# Patient Record
Sex: FEMALE | Race: WHITE | NOT HISPANIC OR LATINO | URBAN - METROPOLITAN AREA
[De-identification: names, ages, dates, MRNs, and addresses within clinical notes are randomized per-mention and may not be internally consistent; named-entity substitution may affect disease eponyms.]

---

## 2023-06-29 LAB
AMPHETAMINE (PRESENCE) IN URINE BY SCREEN METHOD: ABNORMAL
BARBITURATES PRESENCE IN URINE BY SCREEN METHOD: ABNORMAL
BENZODIAZEPINE (PRESENCE) IN URINE BY SCREEN METHOD: ABNORMAL
CANNABINOIDS IN URINE BY SCREEN METHOD: ABNORMAL
COCAINE (PRESENCE) IN URINE BY SCREEN METHOD: ABNORMAL
DRUG SCREEN COMMENT URINE: ABNORMAL
FENTANYL URINE: ABNORMAL
METHADONE (PRESENCE) IN URINE BY SCREEN METHOD: ABNORMAL
OPIATES (PRESENCE) IN URINE BY SCREEN METHOD: ABNORMAL
OXYCODONE (PRESENCE) IN URINE BY SCREEN METHOD: ABNORMAL
PHENCYCLIDINE (PRESENCE) IN URINE BY SCREEN METHOD: ABNORMAL

## 2023-07-04 LAB
AMPHETAMINES,URINE: 1285 NG/ML
MDA,URINE: <200 NG/ML
MDEA,URINE: <200 NG/ML
MDMA,UR: <200 NG/ML
METHAMPHETAMINE QUANTITATIVE URINE: <200 NG/ML
PHENTERMINE,UR: <200 NG/ML

## 2023-10-24 PROBLEM — Z86.59 HISTORY OF ADHD: Status: ACTIVE | Noted: 2023-10-24

## 2023-10-24 PROBLEM — Z86.59 HISTORY OF OCD (OBSESSIVE COMPULSIVE DISORDER): Status: ACTIVE | Noted: 2023-10-24

## 2023-10-24 PROBLEM — F39 MOOD DISORDER (CMS-HCC): Status: ACTIVE | Noted: 2023-10-24

## 2023-10-24 RX ORDER — ARIPIPRAZOLE 20 MG/1
20 TABLET ORAL NIGHTLY
COMMUNITY
End: 2023-10-25 | Stop reason: SDUPTHER

## 2023-10-24 RX ORDER — TRAZODONE HYDROCHLORIDE 100 MG/1
100 TABLET ORAL NIGHTLY
COMMUNITY
End: 2023-10-25 | Stop reason: SDUPTHER

## 2023-10-24 RX ORDER — LORAZEPAM 0.5 MG/1
TABLET ORAL
COMMUNITY
End: 2024-01-17 | Stop reason: WASHOUT

## 2023-10-24 RX ORDER — FLUVOXAMINE MALEATE 100 MG/1
2 TABLET, COATED ORAL NIGHTLY
COMMUNITY
End: 2023-10-25 | Stop reason: SDUPTHER

## 2023-10-24 RX ORDER — ADAPALENE AND BENZOYL PEROXIDE GEL, 0.1%/2.5% 1; 25 MG/G; MG/G
GEL TOPICAL
COMMUNITY

## 2023-10-24 RX ORDER — MINOCYCLINE HYDROCHLORIDE 100 MG/1
CAPSULE ORAL
COMMUNITY

## 2023-10-24 RX ORDER — LISDEXAMFETAMINE DIMESYLATE 30 MG/1
30 CAPSULE ORAL EVERY MORNING
COMMUNITY
End: 2023-10-25 | Stop reason: SDUPTHER

## 2023-10-25 ENCOUNTER — TELEMEDICINE (OUTPATIENT)
Dept: BEHAVIORAL HEALTH | Facility: CLINIC | Age: 28
End: 2023-10-25
Payer: COMMERCIAL

## 2023-10-25 DIAGNOSIS — Z86.59 HISTORY OF OCD (OBSESSIVE COMPULSIVE DISORDER): ICD-10-CM

## 2023-10-25 DIAGNOSIS — Z86.59 HISTORY OF ADHD: ICD-10-CM

## 2023-10-25 DIAGNOSIS — F39 MOOD DISORDER (CMS-HCC): ICD-10-CM

## 2023-10-25 PROCEDURE — 99214 OFFICE O/P EST MOD 30 MIN: CPT

## 2023-10-25 RX ORDER — FLUVOXAMINE MALEATE 100 MG/1
200 TABLET, COATED ORAL NIGHTLY
Qty: 60 TABLET | Refills: 1 | Status: SHIPPED | OUTPATIENT
Start: 2023-10-25 | End: 2023-12-23 | Stop reason: SDUPTHER

## 2023-10-25 RX ORDER — TRAZODONE HYDROCHLORIDE 100 MG/1
100 TABLET ORAL NIGHTLY
Qty: 30 TABLET | Refills: 1 | Status: SHIPPED | OUTPATIENT
Start: 2023-10-25 | End: 2023-12-09 | Stop reason: SDUPTHER

## 2023-10-25 RX ORDER — ARIPIPRAZOLE 20 MG/1
20 TABLET ORAL NIGHTLY
Qty: 30 TABLET | Refills: 1 | Status: SHIPPED | OUTPATIENT
Start: 2023-10-25 | End: 2023-12-07 | Stop reason: SDUPTHER

## 2023-10-25 RX ORDER — LISDEXAMFETAMINE DIMESYLATE 40 MG/1
40 CAPSULE ORAL EVERY MORNING
Qty: 30 CAPSULE | Refills: 0 | Status: SHIPPED | OUTPATIENT
Start: 2023-10-25 | End: 2023-11-20 | Stop reason: SDUPTHER

## 2023-10-25 NOTE — PATIENT INSTRUCTIONS
Continue Abilify, Luvox and trazodone without change   Increase Yyvanse to 40mg daily  Follow up visit in 4 weeks or sooner if needed
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
## 2023-11-20 ENCOUNTER — TELEPHONE (OUTPATIENT)
Dept: BEHAVIORAL HEALTH | Facility: CLINIC | Age: 28
End: 2023-11-20
Payer: COMMERCIAL

## 2023-11-20 DIAGNOSIS — Z86.59 HISTORY OF ADHD: ICD-10-CM

## 2023-11-20 RX ORDER — LISDEXAMFETAMINE DIMESYLATE 40 MG/1
40 CAPSULE ORAL EVERY MORNING
Qty: 30 CAPSULE | Refills: 0 | Status: SHIPPED | OUTPATIENT
Start: 2023-11-23 | End: 2023-12-09 | Stop reason: SDUPTHER

## 2023-11-20 NOTE — PROGRESS NOTES
Received refill request for Vyvanse 40mg daily to [facility] pharmacy on file. Reviewed OARRS on 11/20/23 - Vyvanse last filled on 10/25/23. Refill placed with [facility]NF[facility] date of 11/23/23.

## 2023-12-07 ENCOUNTER — OFFICE VISIT (OUTPATIENT)
Dept: BEHAVIORAL HEALTH | Facility: CLINIC | Age: 28
End: 2023-12-07
Payer: COMMERCIAL

## 2023-12-07 VITALS
SYSTOLIC BLOOD PRESSURE: 113 MMHG | HEART RATE: 94 BPM | HEIGHT: 62 IN | BODY MASS INDEX: 34.96 KG/M2 | DIASTOLIC BLOOD PRESSURE: 77 MMHG | WEIGHT: 190 LBS

## 2023-12-07 DIAGNOSIS — F39 MOOD DISORDER (CMS-HCC): ICD-10-CM

## 2023-12-07 DIAGNOSIS — Z86.59 HISTORY OF ADHD: ICD-10-CM

## 2023-12-07 DIAGNOSIS — Z86.59 HISTORY OF OCD (OBSESSIVE COMPULSIVE DISORDER): ICD-10-CM

## 2023-12-07 PROCEDURE — 99215 OFFICE O/P EST HI 40 MIN: CPT

## 2023-12-07 RX ORDER — ARIPIPRAZOLE 20 MG/1
20 TABLET ORAL NIGHTLY
Qty: 30 TABLET | Refills: 1 | Status: SHIPPED
Start: 2023-12-07 | End: 2024-01-17 | Stop reason: SDUPTHER

## 2023-12-07 RX ORDER — ARIPIPRAZOLE 5 MG/1
5 TABLET ORAL DAILY
Qty: 30 TABLET | Refills: 0 | Status: SHIPPED
Start: 2023-12-07 | End: 2023-12-21 | Stop reason: SINTOL
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
## 2023-12-09 RX ORDER — TRAZODONE HYDROCHLORIDE 100 MG/1
100 TABLET ORAL NIGHTLY
Qty: 30 TABLET | Refills: 1 | Status: SHIPPED
Start: 2023-12-09 | End: 2024-01-17 | Stop reason: SDUPTHER

## 2023-12-09 RX ORDER — LISDEXAMFETAMINE DIMESYLATE 40 MG/1
40 CAPSULE ORAL EVERY MORNING
Qty: 30 CAPSULE | Refills: 0 | Status: SHIPPED
Start: 2023-12-23 | End: 2023-12-21 | Stop reason: ALTCHOICE

## 2023-12-10 NOTE — PATIENT INSTRUCTIONS
Increase Abilify dose to 25mg daily   Continue Vyvanse, Trazodone and Luvox as prescribed  IOP referral placed  Follow up visit in 2-3 weeks or sooner if needed  Please call the office with any questions or concerns

## 2023-12-14 ENCOUNTER — TELEPHONE (OUTPATIENT)
Dept: BEHAVIORAL HEALTH | Facility: CLINIC | Age: 28
End: 2023-12-14
Payer: COMMERCIAL

## 2023-12-20 ENCOUNTER — TELEPHONE (OUTPATIENT)
Dept: BEHAVIORAL HEALTH | Facility: CLINIC | Age: 28
End: 2023-12-20
Payer: COMMERCIAL

## 2023-12-20 NOTE — TELEPHONE ENCOUNTER
Hello, patient has a follow up visit scheduled for tomorrow morning. Will differ on placing new order now and develop a plan together tomorrow if patient is agreeable.

## 2023-12-21 ENCOUNTER — OFFICE VISIT (OUTPATIENT)
Dept: BEHAVIORAL HEALTH | Facility: CLINIC | Age: 28
End: 2023-12-21
Payer: COMMERCIAL

## 2023-12-21 VITALS
HEART RATE: 90 BPM | BODY MASS INDEX: 36.07 KG/M2 | SYSTOLIC BLOOD PRESSURE: 109 MMHG | DIASTOLIC BLOOD PRESSURE: 74 MMHG | WEIGHT: 196 LBS | HEIGHT: 62 IN

## 2023-12-21 DIAGNOSIS — Z86.59 HISTORY OF ADHD: ICD-10-CM

## 2023-12-21 DIAGNOSIS — Z86.59 HISTORY OF OCD (OBSESSIVE COMPULSIVE DISORDER): ICD-10-CM

## 2023-12-21 DIAGNOSIS — G47.00 INSOMNIA, UNSPECIFIED TYPE: ICD-10-CM

## 2023-12-21 DIAGNOSIS — F39 MOOD DISORDER (CMS-HCC): ICD-10-CM

## 2023-12-21 PROCEDURE — 99213 OFFICE O/P EST LOW 20 MIN: CPT

## 2023-12-21 RX ORDER — METHYLPHENIDATE HYDROCHLORIDE 36 MG/1
36 TABLET ORAL DAILY
Qty: 30 TABLET | Refills: 0 | Status: SHIPPED
Start: 2023-12-21 | End: 2023-12-26 | Stop reason: RX
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
## 2023-12-23 PROBLEM — G47.00 INSOMNIA: Status: ACTIVE | Noted: 2023-12-23

## 2023-12-23 RX ORDER — FLUVOXAMINE MALEATE 100 MG/1
200 TABLET, COATED ORAL NIGHTLY
Qty: 60 TABLET | Refills: 1 | Status: SHIPPED
Start: 2023-12-23 | End: 2024-01-17 | Stop reason: SDUPTHER

## 2023-12-24 NOTE — PATIENT INSTRUCTIONS
Continue Trazodone and Luvox as prescribed  Reduce Abilify dose to 20mg daily   STOP Vyvanse and start Generic Concerta 36mg daily   Follow up visit in 4 weeks or sooner if needed  Please call the office or message via [employer] with any questions or concerns

## 2023-12-26 ENCOUNTER — TELEPHONE (OUTPATIENT)
Dept: BEHAVIORAL HEALTH | Facility: CLINIC | Age: 28
End: 2023-12-26
Payer: COMMERCIAL

## 2023-12-26 DIAGNOSIS — Z86.59 HISTORY OF ADHD: ICD-10-CM

## 2023-12-26 RX ORDER — METHYLPHENIDATE HYDROCHLORIDE 36 MG/1
36 TABLET ORAL DAILY
Qty: 30 TABLET | Refills: 0 | Status: SHIPPED | OUTPATIENT
Start: 2023-12-26 | End: 2024-01-17 | Stop reason: SDUPTHER

## 2023-12-26 NOTE — PROGRESS NOTES
Patient called in requesting Concerta be sent to [facility], current pharmacy is out of supply. Reviewed OARRS on 12/26/23. No discrepancies or concerns noted. New prescription placed to requested pharmacy and unfilled order cancelled.

## 2024-01-17 ENCOUNTER — TELEMEDICINE (OUTPATIENT)
Dept: BEHAVIORAL HEALTH | Facility: CLINIC | Age: 29
End: 2024-01-17
Payer: COMMERCIAL

## 2024-01-17 DIAGNOSIS — F39 MOOD DISORDER (CMS-HCC): ICD-10-CM

## 2024-01-17 DIAGNOSIS — Z86.59 HISTORY OF ADHD: ICD-10-CM

## 2024-01-17 DIAGNOSIS — G47.00 INSOMNIA, UNSPECIFIED TYPE: ICD-10-CM

## 2024-01-17 DIAGNOSIS — Z86.59 HISTORY OF OCD (OBSESSIVE COMPULSIVE DISORDER): ICD-10-CM

## 2024-01-17 PROCEDURE — 99214 OFFICE O/P EST MOD 30 MIN: CPT

## 2024-01-17 RX ORDER — ARIPIPRAZOLE 20 MG/1
20 TABLET ORAL NIGHTLY
Qty: 90 TABLET | Refills: 0 | Status: SHIPPED | OUTPATIENT
Start: 2024-01-17 | End: 2024-03-04 | Stop reason: SDUPTHER

## 2024-01-17 RX ORDER — FLUVOXAMINE MALEATE 100 MG/1
200 TABLET, COATED ORAL NIGHTLY
Qty: 180 TABLET | Refills: 0 | Status: SHIPPED | OUTPATIENT
Start: 2024-01-17 | End: 2024-03-04 | Stop reason: SDUPTHER

## 2024-01-17 RX ORDER — TRAZODONE HYDROCHLORIDE 100 MG/1
100 TABLET ORAL NIGHTLY
Qty: 90 TABLET | Refills: 0 | Status: SHIPPED | OUTPATIENT
Start: 2024-01-17 | End: 2024-05-11 | Stop reason: SDUPTHER

## 2024-01-17 RX ORDER — METHYLPHENIDATE HYDROCHLORIDE 54 MG/1
54 TABLET ORAL EVERY MORNING
Qty: 30 TABLET | Refills: 0 | Status: SHIPPED | OUTPATIENT
Start: 2024-02-15 | End: 2024-03-05 | Stop reason: SDUPTHER

## 2024-01-17 RX ORDER — METHYLPHENIDATE HYDROCHLORIDE 54 MG/1
54 TABLET ORAL DAILY
Qty: 30 TABLET | Refills: 0 | Status: SHIPPED | OUTPATIENT
Start: 2024-01-17 | End: 2024-03-05 | Stop reason: SDUPTHER

## 2024-01-17 NOTE — PROGRESS NOTES
"Subjective   Patient ID: Lynda Porter is a 28 y.o. female who presents for ADHD, OCD (Obsessive-Compulsive Disorder), Depression, and Med Management Last visit with this writer on 12/21/23.     HPI  Patient arrived on camera for virtual appointment. Patient appears calm and cooperative. A/Ox3.   When asked how she is doing patient reports \"A little bit better\". Mood improving. 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 Patient reports increasing stress with 3 jobs - \"A lot more on my plate now\". Enjoyed time with family over the holidays - \"really nice hilaria\". Taking time to relax on days off from work. Patient was able to speak with IOP team - not feasible now logistically with new job. Discussed evening IOP at [facility] as alternative option. Patient was able to start Concerta 30mg daily - somewhat helpful for ADHD symptoms but less so than Vyvanse - \"least effective one\". Patient reports difficulty focusing at work and staying on tasks. 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 Currently in marriage counseling with  - going well.     - anxiety -   No worrying/persistent nervousness   Restlessness/keyed up or on edge: intermittent - often at work   Irritability: endorses   Muscle tension: denies   panic attacks denies  rarely using PRN Ativan, not taken in months     - Depression -   Mood: \"down\", \"doing better\"   - influenced by recent external stressors   No anhedonia - enjoying crafting and time with friends   Appetite: stable   + weight gain   Sleep: 6hrs/night   + restless sleep  + TEVIN, using mouth guard   Energy levels: + fatigue  Concentration/Focus: focused during assessment. Impaired per patient   No hopelessness/guilt reported   No SI/HI or NSSIB since last visit - \"Really good with that\"     No isamar/hypomania since last visit   No AVH  No fear/paranoia     OCD   - compulsive " scrap booking improving   - spending habits improving   - no specific obsessive/intrusive thoughts thoughts reported today     ETOH: 1 drink/month. No urges or cravings to drink.   MJ - denies   illicit drug use - denies  Daily nicotine ouches     Safety:   Patient is moderate acute and moderate chronic risk of suicide. Static risk factors include female gender,  race and age 27. Dynamic risk factors include above psychiatric symptoms which we are addressing with medication. Protective factors include no current drug abuse, rational thinking intact, good social support, , help seeking, no SI, hopeful, future oriented.    Objective   Physical Exam  Constitutional:       Appearance: Normal appearance.   Neurological:      Mental Status: She is alert and oriented to person, place, and time.   Psychiatric:         Thought Content: Thought content normal.         Judgment: Judgment normal.       General Appearance: Well groomed, appropriate eye contact  Attitude/Behavior: Cooperative  Motor: No psychomotor agitation or retardation, no tremor or other abnormal movements  Speech: Normal rate, volume, prosody  Gait/Station: WFL - Within functional limits  Mood: depressed  Affect: Euthymic, full-range  Thought Process: Linear, goal directed  Thought Associations: No loosening of associations  Perception: No perceptual abnormalities noted  Sensorium: Alert and oriented to person, place, time and situation  Insight: Intact  Judgement: Intact  Cognition: Cognitively intact to conversational testing with respect to attention, orientation, fund of knowledge, recent and remote memory, and language    Lab Review:   not applicable    Assessment/Plan   Problem List Items Addressed This Visit             ICD-10-CM    History of ADHD Z86.59    Relevant Medications    methylphenidate ER (Concerta) 54 mg ER tablet    methylphenidate ER (Concerta) 54 mg ER tablet (Start on 2/15/2024)    History of OCD (obsessive compulsive  disorder) Z86.59    Relevant Medications    ARIPiprazole (Abilify) 20 mg tablet    fLuvoxaMINE (Luvox) 100 mg tablet    traZODone (Desyrel) 100 mg tablet    Mood disorder (CMS/HCC) F39    Relevant Medications    ARIPiprazole (Abilify) 20 mg tablet    fLuvoxaMINE (Luvox) 100 mg tablet    traZODone (Desyrel) 100 mg tablet    Insomnia G47.00   Lynda Porter (formerly lester) is a 27 year old female currently residing in [place], [place] with her  (Mane). employed full-time as a  and part time as a PT assistant   Pphx: OCD, ADHD, panic attacks, mood disorder unspecified   No hx of psychiatric admission but near SAs and NSSIB present      DSM-5 Diagnosis  Bipolar II (provisional)   ADHD    Insomnia   Anxiety disorder, unspecified   H/o OCD  + hx of trauma     Current Medications   Methylphenidate ER 36mg - last filled on 12/26/23 for 30tabs   - drug screen completed on 6/29/23, Needs CSA   - reviewed OARRS on 1/17/24, no discrepancies or concerns noted   Abilify 20mg daily   fluvoxamine 200mg daily   Trazodone 100mg at bedtime PRN   Good medication adherence   No EPS/TD reported or noted     - mood remains depressed but improving. No SI/HI/NSSIB/anhedonia/hopeless   - No isamar/hypomania since last visit   - rare alcohol use and no MJ use since last visit. No urges/cravings to drink   - ADHD symptoms remain present and impactful. Tapered off Vyvanse d/t shortage.   - positive response with methylphenidate but less effective.   - C/w Abilify 20mg daily   - c/w Luvox 200mg daily, c/w Trazodone 100mg at bedtime  - Increased Generic Concerta dose to 54mg daily   - encouraged patient to continue seeing established counselor   - Patient is aware this writer is leaving  March 2024, she wishes to continue care with  Psychiatry.  - will try and align with provider at [facility]  - Patient is agreeable to plan detailed above.     Past Psychiatric History:  Current and previous counseling or agency  services: Lillian Parr @ [facility] and [facility].  Current or previous psychiatry services: Most recently Doreen at [facility]   + seen by Dr. Ginger Robins at [facility] for many years and multiple other providers at [facility]   Past psychiatric hospitalizations: Denies     Family Psychiatric History:   Mom: anxiety  Dad: mood swing (undiagnosed)     Past Medication Trials:   Zoloft - for a long time, lost efficacy  Prozac - stopped d/t insomnia   Zyprexa - stopped d/t weight gain    Vyvanse - stopped d/t shortage     Start time 10:35am   End time 11am  Prep/charting time 5min   Total time 30min

## 2024-01-18 ENCOUNTER — APPOINTMENT (OUTPATIENT)
Dept: BEHAVIORAL HEALTH | Facility: CLINIC | Age: 29
End: 2024-01-18
Payer: COMMERCIAL

## 2024-03-01 ENCOUNTER — APPOINTMENT (OUTPATIENT)
Dept: BEHAVIORAL HEALTH | Facility: CLINIC | Age: 29
End: 2024-03-01
Payer: COMMERCIAL

## 2024-03-04 ENCOUNTER — TELEMEDICINE (OUTPATIENT)
Dept: BEHAVIORAL HEALTH | Facility: CLINIC | Age: 29
End: 2024-03-04
Payer: COMMERCIAL

## 2024-03-04 DIAGNOSIS — Z86.59 HISTORY OF ADHD: ICD-10-CM

## 2024-03-04 DIAGNOSIS — Z86.59 HISTORY OF OCD (OBSESSIVE COMPULSIVE DISORDER): ICD-10-CM

## 2024-03-04 DIAGNOSIS — F39 MOOD DISORDER (CMS-HCC): ICD-10-CM

## 2024-03-04 PROCEDURE — 99214 OFFICE O/P EST MOD 30 MIN: CPT

## 2024-03-04 NOTE — PROGRESS NOTES
Lynda Porter is a 28 y.o. CF with a hx of ADHD, OCD, and bipolar II. She presents today to establish a relationship with a provider and continue medication management.      HPI  Lynda Porter is currently going through a divorce as a result she has recently moved in with her parents.  Bipolar II: States that she has highs and slows. Sometimes she can be very talkative throughout the day, have racing thoughts, can become impulsive with driving and money despite extreme debt, and sex drive increases. She states the symptoms collectively happens six times a year and last four days. With the use of aripiprazole 20 mg hypomanic states are less frequent and less intense.     ADHD: Dx with ADHD in 2011 d/t delaying task that require mental effort, being poorly organized, loses things very easily, very fidgety, interrupts others often, has a hard time waiting in line, can be easily distracted, can get lost in her thoughts when she is being spoken to directly.  OCD: Dx in 2011 d/t experiencing intrusive thoughts concerning knives feeling that she would hurt herself, if she hit a bump in the road she would have to ride pass the area 20x to make sure no one was injured. In her home she has to check doors at least 100x or until she became tired and check for fires until she relaxed enough to go to sleep.  She feels that obsessively attached to unhealthy men who are coworkers or managers. She states that she has texted some men 100x in a day and more if they did not respond as a consequence they would blocked her from their phone.     Review of Systems   Constitutional: Negative.    HENT: Negative.     Eyes: Negative.    Respiratory: Negative.         Objective   Physical Exam  Psychiatric:         Attention and Perception: Attention normal.         Mood and Affect: Mood normal.         Speech: Speech normal.         Behavior: Behavior is cooperative.         Cognition and Memory: Cognition normal.         Judgment:  Judgment normal.     Acute risk of self-harm remains low despite current stressors (acute and chronic). No reported thoughts of self-harm plan, or intent. She is future-oriented, feels responsibility for her family, and has no hx of SA or hospitalizations.  Assessment/Plan   Lynda Porter is a 27 y/o CF who is currently battling a divorce as a result she is now living with her parents which at times she finds difficult. She has a hx of bipolar II, OCD, and ADHD. Currently she feels stable with the use of her medication regiment.    Continue aripiprazole 20 mg to target bipolar disorder.  Continue Concerta 54 mg to target ADHD symptoms.  Continue fluvoxamine 100 mg to target OCD symptoms.  Please seek therapy by utilizing Psychology Today website.  Provided with crisis/emergency resources, including the [facility] Crisis Hotline 1-462.263.3956, Crisis Text Line (text 0WHBQ to 216719) and the National Suicide Prevention Lifeline hotline 1-544.922.1828. Agrees to call 911 or go to the nearest emergency department if s/he feels unsafe, or has suicidal thinking with a plan or intent.  Advised to call [phone] to report any urgent concerns and/or schedule a sooner follow-up.   Next appointment: 8 weeks

## 2024-03-05 ENCOUNTER — TELEPHONE (OUTPATIENT)
Dept: BEHAVIORAL HEALTH | Facility: CLINIC | Age: 29
End: 2024-03-05
Payer: COMMERCIAL

## 2024-03-05 RX ORDER — FLUVOXAMINE MALEATE 100 MG/1
200 TABLET, COATED ORAL NIGHTLY
Qty: 180 TABLET | Refills: 1 | Status: SHIPPED | OUTPATIENT
Start: 2024-03-05 | End: 2024-05-11 | Stop reason: SDUPTHER

## 2024-03-05 RX ORDER — METHYLPHENIDATE HYDROCHLORIDE 54 MG/1
54 TABLET ORAL DAILY
Qty: 30 TABLET | Refills: 0 | Status: SHIPPED | OUTPATIENT
Start: 2024-04-12 | End: 2024-05-10 | Stop reason: SDUPTHER

## 2024-03-05 RX ORDER — ARIPIPRAZOLE 20 MG/1
20 TABLET ORAL NIGHTLY
Qty: 90 TABLET | Refills: 1 | Status: SHIPPED | OUTPATIENT
Start: 2024-03-05 | End: 2024-05-11 | Stop reason: SDUPTHER

## 2024-03-05 RX ORDER — METHYLPHENIDATE HYDROCHLORIDE 54 MG/1
54 TABLET ORAL EVERY MORNING
Qty: 30 TABLET | Refills: 0 | Status: SHIPPED | OUTPATIENT
Start: 2024-03-12 | End: 2024-05-11 | Stop reason: SDUPTHER

## 2024-03-09 ASSESSMENT — ENCOUNTER SYMPTOMS
RESPIRATORY NEGATIVE: 1
EYES NEGATIVE: 1
CONSTITUTIONAL NEGATIVE: 1

## 2024-05-07 ENCOUNTER — TELEPHONE (OUTPATIENT)
Dept: BEHAVIORAL HEALTH | Facility: CLINIC | Age: 29
End: 2024-05-07
Payer: COMMERCIAL

## 2024-05-10 ENCOUNTER — TELEMEDICINE (OUTPATIENT)
Dept: BEHAVIORAL HEALTH | Facility: CLINIC | Age: 29
End: 2024-05-10
Payer: COMMERCIAL

## 2024-05-10 DIAGNOSIS — Z86.59 HISTORY OF OCD (OBSESSIVE COMPULSIVE DISORDER): ICD-10-CM

## 2024-05-10 DIAGNOSIS — Z86.59 HISTORY OF ADHD: ICD-10-CM

## 2024-05-10 DIAGNOSIS — F39 MOOD DISORDER (CMS-HCC): ICD-10-CM

## 2024-05-10 PROCEDURE — 99214 OFFICE O/P EST MOD 30 MIN: CPT

## 2024-05-10 RX ORDER — METHYLPHENIDATE HYDROCHLORIDE 54 MG/1
54 TABLET ORAL DAILY
Qty: 30 TABLET | Refills: 0 | Status: SHIPPED | OUTPATIENT
Start: 2024-05-10 | End: 2024-06-09

## 2024-05-10 NOTE — PROGRESS NOTES
Subjective   Patient ID: Lynda Porter is a 28 y.o. female who presents for ADHD, OCD, and bipolar II.      HPI  Recently started a new job which is stressful d/t environment because she feels that she has a demanding boss. 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 She feels that she is a student again d/t learning new equipment.   With the use of her medication regiment she remains stable but states that she has situational stressor such as a divorce, learning a new job, and living with her parents.   She remains in therapy.     Review of Systems   Constitutional: Negative.    HENT: Negative.     Eyes: Negative.    Respiratory: Negative.           Objective   Physical Exam  Psychiatric:         Attention and Perception: Attention normal.         Mood and Affect: Mood normal.         Speech: Speech normal.         Behavior: Behavior is cooperative.         Cognition and Memory: Cognition normal.         Judgment: Judgment normal.       Acute risk of self-harm remains low despite current stressors (acute and chronic). No reported thoughts of self-harm plan, or intent. She is future-oriented, feels responsibility for her family, and has no hx of SA or hospitalizations.      Assessment/Plan   Lynda Porter is a 27 y/o CF who has a hx of bipolar II,OCD,and ADHD. She is current battling a divorce,as a result she is living with her parents, and now has a new job which has all been stress inducing. Currently she feels stable with the use of her medication regiment.     Continue aripiprazole 20 mg to target bipolar disorder.  Continue Concerta 54 mg to target ADHD symptoms.  Continue fluvoxamine 100 mg to target OCD symptoms.  Continue trazodone 100 mg to target insomnia.   Please seek therapy by utilizing [employer] Today website.  Provided with crisis/emergency resources, including the [facility] Crisis Hotline 1-309.617.1072, Crisis Text Line (text 5UPJN cf 698271) and the National  Suicide Prevention Lifeline hotline 1-536.371.8062. Agrees to call 911 or go to the nearest emergency department if s/he feels unsafe, or has suicidal thinking with a plan or intent.  Advised to call [phone] to report any urgent concerns and/or schedule a sooner follow-up.   Next appointment: 8 weeks

## 2024-05-11 RX ORDER — FLUVOXAMINE MALEATE 100 MG/1
200 TABLET, COATED ORAL NIGHTLY
Qty: 180 TABLET | Refills: 1 | Status: SHIPPED | OUTPATIENT
Start: 2024-05-11

## 2024-05-11 RX ORDER — TRAZODONE HYDROCHLORIDE 100 MG/1
100 TABLET ORAL NIGHTLY
Qty: 90 TABLET | Refills: 1 | Status: SHIPPED | OUTPATIENT
Start: 2024-05-11

## 2024-05-11 RX ORDER — ARIPIPRAZOLE 20 MG/1
20 TABLET ORAL NIGHTLY
Qty: 90 TABLET | Refills: 1 | Status: SHIPPED | OUTPATIENT
Start: 2024-05-11

## 2024-05-11 RX ORDER — METHYLPHENIDATE HYDROCHLORIDE 54 MG/1
54 TABLET ORAL EVERY MORNING
Qty: 30 TABLET | Refills: 0 | Status: SHIPPED | OUTPATIENT
Start: 2024-06-08 | End: 2024-07-08

## 2024-05-11 ASSESSMENT — ENCOUNTER SYMPTOMS
RESPIRATORY NEGATIVE: 1
EYES NEGATIVE: 1
CONSTITUTIONAL NEGATIVE: 1

## 2024-06-10 PROBLEM — F33.1 MODERATE EPISODE OF RECURRENT MAJOR DEPRESSIVE DISORDER (MULTI): Status: ACTIVE | Noted: 2022-10-12

## 2024-06-10 RX ORDER — NITROFURANTOIN 25; 75 MG/1; MG/1
CAPSULE ORAL
COMMUNITY
Start: 2024-02-03

## 2024-06-10 RX ORDER — TRAZODONE HYDROCHLORIDE 50 MG/1
TABLET ORAL
COMMUNITY
Start: 2023-07-25

## 2024-06-10 RX ORDER — ADAPALENE AND BENZOYL PEROXIDE 3; 25 MG/G; MG/G
GEL TOPICAL
COMMUNITY
Start: 2024-04-04

## 2024-06-10 RX ORDER — ARIPIPRAZOLE 15 MG/1
TABLET ORAL
COMMUNITY
Start: 2023-07-03 | End: 2024-06-20 | Stop reason: WASHOUT

## 2024-06-19 ENCOUNTER — APPOINTMENT (OUTPATIENT)
Dept: BEHAVIORAL HEALTH | Facility: CLINIC | Age: 29
End: 2024-06-19
Payer: COMMERCIAL

## 2024-06-19 DIAGNOSIS — Z86.59 HISTORY OF ADHD: ICD-10-CM

## 2024-06-19 DIAGNOSIS — F39 MOOD DISORDER (CMS-HCC): ICD-10-CM

## 2024-06-19 DIAGNOSIS — Z86.59 HISTORY OF OCD (OBSESSIVE COMPULSIVE DISORDER): ICD-10-CM

## 2024-06-19 PROCEDURE — 99214 OFFICE O/P EST MOD 30 MIN: CPT

## 2024-06-19 ASSESSMENT — ENCOUNTER SYMPTOMS
RESPIRATORY NEGATIVE: 1
EYES NEGATIVE: 1
CONSTITUTIONAL NEGATIVE: 1

## 2024-06-19 NOTE — PROGRESS NOTES
Subjective   Patient ID: Lynda Porter is a 28 y.o. female who presents for  ADHD, OCD, and bipolar II.     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  She admits that she is in a poor mood at times because her  wants to proceed with the divorce and cease all communication. She feels that he has already moved on mentally and emotionally which hurts.  She is now in therapy once a week which she feels is helpful.   With her medication regiment she states that her mood remains stable.     Review of Systems   Constitutional: Negative.    HENT: Negative.     Eyes: Negative.    Respiratory: Negative.           Objective   Physical Exam  Psychiatric:         Attention and Perception: Attention normal.         Mood and Affect: Mood is anxious.         Speech: Speech normal.         Behavior: Behavior is cooperative.         Thought Content: Thought content normal.         Cognition and Memory: Cognition normal.         Judgment: Judgment normal.       Acute risk of self-harm remains low despite current stressors (acute and chronic). No reported thoughts of self-harm plan, or intent. She is future-oriented, feels responsibility for her family, and has no hx of SA or hospitalizations.     Assessment/Plan   Lynda Porter is a 29 y/o CF who has a hx of bipolar II,OCD,and ADHD. 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 But she states at times that her mood can be poor because her  wants to proceed with the divorce and cease all communication. She feels that he has already moved on mentally and emotionally which hurts.  She is now in therapy once a week which she feels is helpful.   With her medication regiment she states that her mood remains stable.      Continue aripiprazole 20 mg to target bipolar disorder.  Continue  Concerta 54 mg to target ADHD symptoms.  Continue fluvoxamine 100 mg to target OCD symptoms.  Continue trazodone 100 mg to target insomnia.   Please seek therapy by utilizing Psychology Today website.  Provided with crisis/emergency resources, including the [facility] Crisis Hotline 1-769.142.5654, Crisis Text Line (text 3SSRV to 031974) and the National Suicide Prevention Lifeline hotline 1-241.512.3014. Agrees to call 911 or go to the nearest emergency department if s/he feels unsafe, or has suicidal thinking with a plan or intent.  Advised to call [phone] to report any urgent concerns and/or schedule a sooner follow-up.   Next appointment: 8 weeks

## 2024-06-20 RX ORDER — ARIPIPRAZOLE 20 MG/1
20 TABLET ORAL NIGHTLY
Qty: 90 TABLET | Refills: 1 | Status: SHIPPED | OUTPATIENT
Start: 2024-06-20

## 2024-06-20 RX ORDER — METHYLPHENIDATE HYDROCHLORIDE 54 MG/1
54 TABLET ORAL DAILY
Qty: 30 TABLET | Refills: 0 | Status: SHIPPED | OUTPATIENT
Start: 2024-07-06 | End: 2024-08-05

## 2024-06-24 RX ORDER — MINOCYCLINE HYDROCHLORIDE 50 MG/1
CAPSULE ORAL
COMMUNITY
Start: 2024-06-17

## 2024-07-25 ENCOUNTER — APPOINTMENT (OUTPATIENT)
Dept: BEHAVIORAL HEALTH | Facility: CLINIC | Age: 29
End: 2024-07-25
Payer: COMMERCIAL

## 2024-07-25 DIAGNOSIS — F31.81 BIPOLAR 2 DISORDER (MULTI): ICD-10-CM

## 2024-07-25 PROCEDURE — 99214 OFFICE O/P EST MOD 30 MIN: CPT

## 2024-07-25 ASSESSMENT — ENCOUNTER SYMPTOMS
CONSTITUTIONAL NEGATIVE: 1
EYES NEGATIVE: 1

## 2024-07-25 NOTE — PROGRESS NOTES
Subjective   Patient ID: Lynda Porter is a 28 y.o. female who presents for ADHD, OCD, and bipolar II.     Virtual or Telephone Consent    An interactive audio and video telecommunication system which permits real time communications between the patient (at the originating site) and provider (at the distant site) was utilized to provide this telehealth service.   Verbal consent was requested and obtained from Lynda Porter on this date, 07/25/24 for a telehealth visit.      HPI  She loves her new job. She states that she likes her coworkers, she received a lot of training and she feels supported.   Her dissolution of her divorce is 8/19 as result she predicts that she would be sad that day.   Since she is now living with her parents and brother she is now trying form a healthier relationship with her family.  She remains in therapy once a week which she feels is helpful. She feels her OCD thoughts are under control and feels that she is sleeping better now that she has adjust to her new environment as a result has decreased her usage of trazodone.   With her medication regiment she states that her mood remains stable.     Review of Systems   Constitutional: Negative.    Eyes: Negative.          Objective   Physical Exam  Psychiatric:         Attention and Perception: Attention normal.         Mood and Affect: Mood normal.         Speech: Speech normal.         Behavior: Behavior is cooperative.         Thought Content: Thought content normal.         Cognition and Memory: Cognition normal.         Judgment: Judgment normal.         Acute risk of self-harm remains low despite current stressors (acute and chronic). No reported thoughts of self-harm plan, or intent. She is future-oriented, feels responsibility for her family, and has no hx of SA or hospitalizations.        Assessment/Plan   Lynda Porter is a 27 y/o CF who has a hx of bipolar II,OCD,and ADHD. She is now in therapy once a week which she  feels is helpful. With her medication regiment she states that her mood remains stable.      Continue aripiprazole 20 mg to target bipolar disorder.  Continue Concerta 54 mg to target ADHD symptoms.  Continue fluvoxamine 100 mg to target OCD symptoms.  Continue trazodone 100 mg to target insomnia.   Please seek therapy by utilizing Psychology Today website.  Provided with crisis/emergency resources, including the [facility] Crisis Hotline 1-966.403.7217, Crisis Text Line (text 4EGZF hu 120787) and the National Suicide Prevention Lifeline hotline 1-106.727.8106. Agrees to call 911 or go to the nearest emergency department if s/he feels unsafe, or has suicidal thinking with a plan or intent.  Advised to call [phone] to report any urgent concerns and/or schedule a sooner follow-up.   Next appointment: 8 weeks

## 2024-08-02 ENCOUNTER — TELEPHONE (OUTPATIENT)
Dept: BEHAVIORAL HEALTH | Facility: CLINIC | Age: 29
End: 2024-08-02
Payer: COMMERCIAL

## 2024-08-02 DIAGNOSIS — Z86.59 HISTORY OF ADHD: ICD-10-CM

## 2024-08-02 RX ORDER — METHYLPHENIDATE HYDROCHLORIDE 54 MG/1
54 TABLET ORAL DAILY
Qty: 30 TABLET | Refills: 0 | Status: CANCELLED | OUTPATIENT
Start: 2024-08-02 | End: 2024-09-01

## 2024-08-04 DIAGNOSIS — Z86.59 HISTORY OF ADHD: ICD-10-CM

## 2024-08-04 RX ORDER — METHYLPHENIDATE HYDROCHLORIDE 54 MG/1
54 TABLET ORAL DAILY
Qty: 30 TABLET | Refills: 0 | Status: SHIPPED | OUTPATIENT
Start: 2024-09-05 | End: 2024-10-05

## 2024-08-04 RX ORDER — METHYLPHENIDATE HYDROCHLORIDE 54 MG/1
54 TABLET ORAL EVERY MORNING
Qty: 30 TABLET | Refills: 0 | Status: SHIPPED | OUTPATIENT
Start: 2024-08-04 | End: 2024-09-03

## 2024-08-05 ENCOUNTER — TELEPHONE (OUTPATIENT)
Dept: BEHAVIORAL HEALTH | Facility: CLINIC | Age: 29
End: 2024-08-05
Payer: COMMERCIAL

## 2024-08-26 ENCOUNTER — APPOINTMENT (OUTPATIENT)
Dept: BEHAVIORAL HEALTH | Facility: CLINIC | Age: 29
End: 2024-08-26
Payer: COMMERCIAL

## 2024-09-26 ENCOUNTER — APPOINTMENT (OUTPATIENT)
Dept: BEHAVIORAL HEALTH | Facility: CLINIC | Age: 29
End: 2024-09-26
Payer: COMMERCIAL

## 2024-09-26 DIAGNOSIS — Z86.59 HISTORY OF ADHD: ICD-10-CM

## 2024-09-26 PROCEDURE — 99214 OFFICE O/P EST MOD 30 MIN: CPT

## 2024-09-26 RX ORDER — METHYLPHENIDATE HYDROCHLORIDE 54 MG/1
54 TABLET ORAL DAILY
Qty: 30 TABLET | Refills: 0 | Status: SHIPPED | OUTPATIENT
Start: 2024-10-05 | End: 2024-11-04

## 2024-09-26 ASSESSMENT — ENCOUNTER SYMPTOMS
RESPIRATORY NEGATIVE: 1
EYES NEGATIVE: 1
CONSTITUTIONAL NEGATIVE: 1

## 2024-09-26 NOTE — PROGRESS NOTES
Subjective   Patient ID: Lynda Porter is a 29 y.o. female who presents for ADHD, OCD, and bipolar II.     Virtual or Telephone Consent    An interactive audio and video telecommunication system which permits real time communications between the patient (at the originating site) and provider (at the distant site) was utilized to provide this telehealth service.   Verbal consent was requested and obtained from Lynda Porter on this date, 09/26/24 for a telehealth visit.      HPI  She is officially divorce and now has a boyfriend that she is happy with.  She continues to remain happy on her new job.  She states that mood is remaining stable therefore she feels at this moment that she would like to titrate off of some medications.   Over the course of the year she has gain 30 lbs d/t the use of aripiprazole 20 mg.   She remains in therapy once a week which she feels is helpful and she feels her OCD thoughts are under control.     Review of Systems   Constitutional: Negative.    HENT: Negative.     Eyes: Negative.    Respiratory: Negative.           Objective   Physical Exam  Psychiatric:         Attention and Perception: Attention normal.         Mood and Affect: Mood normal.         Speech: Speech normal.         Behavior: Behavior is cooperative.         Thought Content: Thought content normal.         Cognition and Memory: Cognition normal.         Judgment: Judgment normal.         Acute risk of self-harm remains low despite current stressors (acute and chronic). No reported thoughts of self-harm plan, or intent. She is future-oriented, feels responsibility for her family, and has no hx of SA or hospitalizations.     Assessment/Plan   Lynda Porter is a 30 y/o CF who has a hx of bipolar II,OCD,and ADHD. She is now in therapy once a week which she feels is helpful. With her medication regiment she states that her mood remains stable as a result she is now requesting to titrate off of aripiprazole 20  mg d/t wt gain. She is now willing to trial aripiprazole 10 mg and remain in therapy.      Continue aripiprazole 10 mg to target bipolar disorder.  Continue Concerta 54 mg to target ADHD symptoms.  Continue fluvoxamine 100 mg to target OCD symptoms.  Continue trazodone 100 mg to target insomnia.   Please seek therapy by utilizing Psychology Today website.  Provided with crisis/emergency resources, including the [facility] Crisis Hotline 1-576.531.8023, Crisis Text Line (text 9QJBD jv 776125) and the National Suicide Prevention Lifeline hotline 1-359.196.3579. Agrees to call 911 or go to the nearest emergency department if s/he feels unsafe, or has suicidal thinking with a plan or intent.  Advised to call [phone] to report any urgent concerns and/or schedule a sooner follow-up.   Next appointment: 8 weeks

## 2024-10-24 ENCOUNTER — APPOINTMENT (OUTPATIENT)
Dept: BEHAVIORAL HEALTH | Facility: CLINIC | Age: 29
End: 2024-10-24
Payer: COMMERCIAL

## 2024-10-24 DIAGNOSIS — Z86.59 HISTORY OF OCD (OBSESSIVE COMPULSIVE DISORDER): ICD-10-CM

## 2024-10-24 DIAGNOSIS — Z86.59 HISTORY OF ADHD: ICD-10-CM

## 2024-10-24 DIAGNOSIS — F39 MOOD DISORDER (CMS-HCC): ICD-10-CM

## 2024-10-24 PROCEDURE — 99212 OFFICE O/P EST SF 10 MIN: CPT

## 2024-10-24 ASSESSMENT — ENCOUNTER SYMPTOMS
CONSTITUTIONAL NEGATIVE: 1
EYES NEGATIVE: 1
RESPIRATORY NEGATIVE: 1

## 2024-10-29 RX ORDER — ARIPIPRAZOLE 20 MG/1
20 TABLET ORAL NIGHTLY
Qty: 90 TABLET | Refills: 1 | Status: SHIPPED | OUTPATIENT
Start: 2024-10-29

## 2024-10-29 RX ORDER — TRAZODONE HYDROCHLORIDE 100 MG/1
100 TABLET ORAL NIGHTLY
Qty: 90 TABLET | Refills: 1 | Status: SHIPPED | OUTPATIENT
Start: 2024-10-29

## 2024-10-29 RX ORDER — FLUVOXAMINE MALEATE 100 MG/1
200 TABLET, COATED ORAL NIGHTLY
Qty: 180 TABLET | Refills: 1 | Status: SHIPPED | OUTPATIENT
Start: 2024-10-29

## 2024-10-29 RX ORDER — METHYLPHENIDATE HYDROCHLORIDE 54 MG/1
54 TABLET ORAL DAILY
Qty: 30 TABLET | Refills: 0 | Status: SHIPPED | OUTPATIENT
Start: 2024-12-02 | End: 2025-01-01

## 2024-10-29 RX ORDER — METHYLPHENIDATE HYDROCHLORIDE 54 MG/1
54 TABLET ORAL EVERY MORNING
Qty: 30 TABLET | Refills: 0 | Status: SHIPPED | OUTPATIENT
Start: 2024-11-02 | End: 2024-12-02

## 2024-10-30 ENCOUNTER — TELEPHONE (OUTPATIENT)
Dept: BEHAVIORAL HEALTH | Facility: CLINIC | Age: 29
End: 2024-10-30
Payer: COMMERCIAL

## 2024-11-14 ENCOUNTER — APPOINTMENT (OUTPATIENT)
Dept: BEHAVIORAL HEALTH | Facility: CLINIC | Age: 29
End: 2024-11-14
Payer: COMMERCIAL

## 2024-11-14 DIAGNOSIS — F39 MOOD DISORDER (CMS-HCC): ICD-10-CM

## 2024-11-14 DIAGNOSIS — Z86.59 HISTORY OF OCD (OBSESSIVE COMPULSIVE DISORDER): ICD-10-CM

## 2024-11-14 PROCEDURE — 99213 OFFICE O/P EST LOW 20 MIN: CPT

## 2024-11-14 RX ORDER — FLUVOXAMINE MALEATE 100 MG/1
200 TABLET, COATED ORAL NIGHTLY
Qty: 180 TABLET | Refills: 1 | Status: SHIPPED | OUTPATIENT
Start: 2024-11-14

## 2024-11-14 ASSESSMENT — ENCOUNTER SYMPTOMS
CONSTITUTIONAL NEGATIVE: 1
EYES NEGATIVE: 1
RESPIRATORY NEGATIVE: 1

## 2024-11-14 NOTE — PROGRESS NOTES
Subjective   Patient ID: Lynda Porter is a 29 y.o. female who presents for ADHD, OCD, and bipolar II.        Virtual or Telephone Consent    An interactive audio and video telecommunication system which permits real time communications between the patient (at the originating site) and provider (at the distant site) was utilized to provide this telehealth service.   Verbal consent was requested and obtained from Lynda Porter on this date, 11/14/24 for a telehealth visit.      HPI  She had a miscarriage after learning that she was 5 weeks pregnant. As a result she wants to trial birth control for preventive measures. A week afterwards she felt more stressed and overwhelmed than normal. But she feels that her mood has stabilized.   She remains in therapy monthly.   Denies thoughts of Self-harm and SI/HI/AVH.    Review of Systems   Constitutional: Negative.    HENT: Negative.     Eyes: Negative.    Respiratory: Negative.           Objective   Physical Exam  Psychiatric:         Attention and Perception: Attention normal.         Mood and Affect: Mood normal.         Speech: Speech normal.         Behavior: Behavior is cooperative.         Thought Content: Thought content normal.         Cognition and Memory: Cognition normal.         Judgment: Judgment normal.         Acute risk of self-harm remains low despite current stressors (acute and chronic). No reported thoughts of self-harm plan, or intent. She is future-oriented, feels responsibility for her family, and has no hx of SA or hospitalizations.     Assessment/Plan   Lynda Porter is a 28 y/o CF who has a hx of bipolar II,OCD,and ADHD. She is now in therapy once a month which she feels is helpful. With her medication regiment she states that her mood remains stable but it has been altered by a miscarriage and her new relationship d/t lack of boundaries.     Continue aripiprazole 20 mg to target bipolar disorder.  Continue Concerta 54 mg to target  ADHD symptoms.  Continue fluvoxamine 100 mg to target OCD symptoms.  Continue trazodone 100 mg to target insomnia.   Please seek therapy by utilizing Psychology Today website.  Provided with crisis/emergency resources, including the [facility] Crisis Hotline 1-118.884.4681, Crisis Text Line (text 3AVVS to 200009) and the National Suicide Prevention Lifeline hotline 1-171.100.1011. Agrees to call 911 or go to the nearest emergency department if s/he feels unsafe, or has suicidal thinking with a plan or intent.  Advised to call [phone] to report any urgent concerns and/or schedule a sooner follow-up.   Next appointment: 12/19 at 9:00

## 2024-12-19 ENCOUNTER — APPOINTMENT (OUTPATIENT)
Dept: BEHAVIORAL HEALTH | Facility: CLINIC | Age: 29
End: 2024-12-19
Payer: COMMERCIAL

## 2024-12-30 ENCOUNTER — TELEPHONE (OUTPATIENT)
Dept: BEHAVIORAL HEALTH | Facility: CLINIC | Age: 29
End: 2024-12-30
Payer: COMMERCIAL

## 2024-12-30 ENCOUNTER — TELEPHONE (OUTPATIENT)
Dept: OTHER | Age: 29
End: 2024-12-30
Payer: COMMERCIAL

## 2024-12-30 DIAGNOSIS — Z86.59 HISTORY OF ADHD: ICD-10-CM

## 2024-12-30 RX ORDER — METHYLPHENIDATE HYDROCHLORIDE 54 MG/1
54 TABLET ORAL EVERY MORNING
Qty: 30 TABLET | Refills: 0 | Status: SHIPPED
Start: 2025-01-29 | End: 2025-01-03 | Stop reason: SDUPTHER

## 2024-12-30 RX ORDER — METHYLPHENIDATE HYDROCHLORIDE 54 MG/1
54 TABLET ORAL DAILY
Qty: 30 TABLET | Refills: 0 | Status: CANCELLED | OUTPATIENT
Start: 2024-12-30 | End: 2025-01-29

## 2024-12-30 RX ORDER — METHYLPHENIDATE HYDROCHLORIDE 54 MG/1
54 TABLET ORAL DAILY
Qty: 30 TABLET | Refills: 0 | Status: SHIPPED
Start: 2024-12-30 | End: 2025-01-03 | Stop reason: SDUPTHER

## 2024-12-30 NOTE — TELEPHONE ENCOUNTER
Gisele sees you not until 2/3 can we refill her Concerta she said she'll be out before that, thank you

## 2025-01-03 ENCOUNTER — TELEPHONE (OUTPATIENT)
Dept: OTHER | Age: 30
End: 2025-01-03
Payer: COMMERCIAL

## 2025-01-03 RX ORDER — METHYLPHENIDATE HYDROCHLORIDE 54 MG/1
54 TABLET ORAL EVERY MORNING
Qty: 30 TABLET | Refills: 0 | Status: SHIPPED | OUTPATIENT
Start: 2025-02-01 | End: 2025-03-03

## 2025-01-03 RX ORDER — METHYLPHENIDATE HYDROCHLORIDE 54 MG/1
54 TABLET ORAL DAILY
Qty: 30 TABLET | Refills: 0 | Status: SHIPPED | OUTPATIENT
Start: 2025-01-03 | End: 2025-02-02

## 2025-01-03 NOTE — TELEPHONE ENCOUNTER
RX REFILL-methylphenidate ER (Concerta) 54 mg extended release tablet. Pharmacy has been updated to [facility] in [place]. Patient will be out of meds before next scheduled appointment on 2/3/25.

## 2025-02-03 ENCOUNTER — APPOINTMENT (OUTPATIENT)
Dept: BEHAVIORAL HEALTH | Facility: CLINIC | Age: 30
End: 2025-02-03
Payer: COMMERCIAL

## 2025-02-03 DIAGNOSIS — Z86.59 HISTORY OF ADHD: ICD-10-CM

## 2025-02-03 PROCEDURE — 99213 OFFICE O/P EST LOW 20 MIN: CPT

## 2025-02-03 RX ORDER — METHYLPHENIDATE HYDROCHLORIDE 54 MG/1
54 TABLET ORAL DAILY
Qty: 30 TABLET | Refills: 0 | Status: SHIPPED | OUTPATIENT
Start: 2025-02-28 | End: 2025-03-30

## 2025-02-03 NOTE — PROGRESS NOTES
Subjective   Patient ID: Lynda Porter is a 29 y.o. female who presents for ADHD, OCD, and bipolar II.     Virtual or Telephone Consent    An interactive audio and video telecommunication system which permits real time communications between the patient (at the originating site) and provider (at the distant site) was utilized to provide this telehealth service.   Verbal consent was requested and obtained from Lynda Porter on this date, 02/03/25 for a telehealth visit.      HPI  Since we last met her mood has declined d/t the anniversary of her and her  splitting and her recent miscarriage. As a result she has been trying to stay busy with her crafts and has found support in others on the job who has experience miscarriages as well.   She remains in therapy monthly.   Denies thoughts of Self-harm and SI/HI/AVH.     Review of Systems   All other systems reviewed and are negative.        Objective   Physical Exam  Psychiatric:         Attention and Perception: Attention normal.         Mood and Affect: Mood is depressed.         Speech: Speech normal.         Behavior: Behavior is cooperative.         Thought Content: Thought content normal.         Cognition and Memory: Cognition normal.         Judgment: Judgment normal.       Acute risk of self-harm remains low despite current stressors (acute and chronic). No reported thoughts of self-harm plan, or intent. She is future-oriented, feels responsibility for her family, and has no hx of SA or hospitalizations.     Assessment/Plan   Lynda Porter is a 28 y/o CF who has a hx of bipolar II,OCD,and ADHD. She is now in therapy monthly which she feels is helpful. With her medication regiment she states that her mood remains stable but it has been altered by a miscarriage and the anniversary of the split of her marriage.     Continue aripiprazole 20 mg to target bipolar disorder.  Continue Concerta 54 mg to target ADHD symptoms.  Continue fluvoxamine  100 mg to target OCD symptoms.  Continue trazodone 100 mg to target insomnia.   Please seek therapy by utilizing [employer] website.  Provided with crisis/emergency resources, including the [facility] Crisis Hotline 1-482.271.9061, Crisis Text Line (text 2OJVG to 152806) and the National Suicide Prevention Lifeline hotline 1-209.515.7898. Agrees to call 911 or go to the nearest emergency department if s/he feels unsafe, or has suicidal thinking with a plan or intent.  Advised to call [phone] to report any urgent concerns and/or schedule a sooner follow-up.   Next appointment: 3/6 at 10:30 (v)

## 2025-03-06 ENCOUNTER — APPOINTMENT (OUTPATIENT)
Dept: BEHAVIORAL HEALTH | Facility: CLINIC | Age: 30
End: 2025-03-06
Payer: COMMERCIAL

## 2025-03-27 ENCOUNTER — APPOINTMENT (OUTPATIENT)
Dept: BEHAVIORAL HEALTH | Facility: CLINIC | Age: 30
End: 2025-03-27
Payer: COMMERCIAL

## 2025-03-27 DIAGNOSIS — F33.1 MODERATE EPISODE OF RECURRENT MAJOR DEPRESSIVE DISORDER: ICD-10-CM

## 2025-03-27 PROCEDURE — 99214 OFFICE O/P EST MOD 30 MIN: CPT

## 2025-03-27 RX ORDER — PREDNISONE 20 MG/1
TABLET ORAL
COMMUNITY
Start: 2025-01-08

## 2025-03-27 RX ORDER — BENZONATATE 100 MG/1
100 CAPSULE ORAL EVERY 8 HOURS PRN
COMMUNITY
Start: 2024-09-28

## 2025-03-27 RX ORDER — FLUTICASONE PROPIONATE 50 MCG
1 SPRAY, SUSPENSION (ML) NASAL
COMMUNITY
Start: 2024-09-28

## 2025-03-27 NOTE — PROGRESS NOTES
Subjective   Patient ID: Lynda Porter is a 29 y.o. female who presents for ADHD, OCD, and bipolar II.     Virtual or Telephone Consent    An interactive audio and video telecommunication system which permits real time communications between the patient (at the originating site) and provider (at the distant site) was utilized to provide this telehealth service.   Verbal consent was requested and obtained from Lynda Porter on this date, 03/28/25 for a telehealth visit and the patient's location was confirmed at the time of the visit.     HPI  She recently had a miscarriage  and feels like it was worst than her first miscarriage.  During the ultrasound she was told that fetus was below healthy measurements of the stage of the pregnancy. She feels the whole pregnancy was complicated because of lack of support from the child's father and been told from his prior girlfriend that she will always come first. She feels insecure in the relationship and unsupported at times. But she does feel supported by her family.  Denies self-harm thoughts, AVH, or SI/HI.    Review of Systems   All other systems reviewed and are negative.        Objective   Physical Exam  Psychiatric:         Attention and Perception: Attention normal.         Mood and Affect: Mood is depressed. Affect is tearful.         Speech: Speech normal.         Behavior: Behavior is cooperative.         Thought Content: Thought content normal.         Cognition and Memory: Cognition normal.       Acute risk of self-harm remains low despite current stressors (acute and chronic). No reported thoughts of self-harm plan, or intent. She is future-oriented, feels responsibility for her family, and has no hx of SA or hospitalizations.        Assessment/Plan   Lynda Porter is a 30 y/o CF who has a hx of bipolar II,OCD,and ADHD. She is now in therapy monthly which she feels is helpful. With her medication regiment she states that her mood remains stable  but it has been altered by a miscarriage and the anniversary of the split of her marriage.     Continue aripiprazole 20 mg to target bipolar disorder.  Continue Concerta 54 mg to target ADHD symptoms.  Continue fluvoxamine 100 mg to target OCD symptoms.  Continue trazodone 100 mg to target insomnia.   Please seek therapy by utilizing [employer] website.  Provided with crisis/emergency resources, including the [facility] Crisis Hotline 1-413.487.2313, Crisis Text Line (text 3LGAV jj 970832) and the National Suicide Prevention Lifeline hotline 1-834.537.2408. Agrees to call 911 or go to the nearest emergency department if s/he feels unsafe, or has suicidal thinking with a plan or intent.  Advised to call [phone] to report any urgent concerns and/or schedule a sooner follow-up.   Next appointment: 6 weeks

## 2025-03-31 DIAGNOSIS — F41.9 ANXIETY: ICD-10-CM

## 2025-03-31 RX ORDER — LORAZEPAM 0.5 MG/1
0.5 TABLET ORAL 2 TIMES DAILY
Qty: 20 TABLET | Refills: 0 | Status: SHIPPED | OUTPATIENT
Start: 2025-03-31 | End: 2025-04-10

## 2025-04-10 ENCOUNTER — APPOINTMENT (OUTPATIENT)
Dept: BEHAVIORAL HEALTH | Facility: CLINIC | Age: 30
End: 2025-04-10
Payer: COMMERCIAL

## 2025-04-10 DIAGNOSIS — Z86.59 HISTORY OF OCD (OBSESSIVE COMPULSIVE DISORDER): ICD-10-CM

## 2025-04-10 DIAGNOSIS — F39 MOOD DISORDER (CMS-HCC): ICD-10-CM

## 2025-04-10 DIAGNOSIS — Z86.59 HISTORY OF ADHD: ICD-10-CM

## 2025-04-10 PROCEDURE — 99213 OFFICE O/P EST LOW 20 MIN: CPT

## 2025-04-10 RX ORDER — ARIPIPRAZOLE 30 MG/1
30 TABLET ORAL NIGHTLY
Qty: 90 TABLET | Refills: 0 | Status: SHIPPED | OUTPATIENT
Start: 2025-04-10 | End: 2026-04-10

## 2025-04-10 RX ORDER — FLUVOXAMINE MALEATE 100 MG/1
200 TABLET, COATED ORAL NIGHTLY
Qty: 180 TABLET | Refills: 1 | Status: SHIPPED | OUTPATIENT
Start: 2025-04-10

## 2025-04-10 RX ORDER — METHYLPHENIDATE HYDROCHLORIDE 54 MG/1
54 TABLET ORAL DAILY
Qty: 30 TABLET | Refills: 0 | Status: SHIPPED | OUTPATIENT
Start: 2025-04-17 | End: 2025-05-17

## 2025-04-10 NOTE — PROGRESS NOTES
Subjective   Patient ID: Lynda Porter is a 29 y.o. female who presents for ADHD, OCD, and bipolar II.     Virtual or Telephone Consent    An interactive audio and video telecommunication system which permits real time communications between the patient (at the originating site) and provider (at the distant site) was utilized to provide this telehealth service.   Verbal consent was requested and obtained from Lynda Porter on this date, 04/10/25 for a telehealth visit and the patient's location was confirmed at the time of the visit.     HPI  Since we last met panic attacks have decreased by 50%. Grounding techniques such as deep breathing, applying ice on her face, and splashing water on her face have been helpful. But anxiety remains high as a result she is experiencing ongoing GI issues. Due to her depression after her miscarriage she lost her appetite and started drinking alcohol but ceased drinking d/t noticing that it was becoming excessive.   She notice that her mood has remained low recognized that she was very shelter throughout her childhood and her marriage, now that she is divorce she is experiencing things in the world that prevents her mood from improving. She plans to restart therapy today after placing a hold on the visits d/t trying to save for childcare.  Denies self-harm thoughts, AVH, or SI/HI.       Review of Systems   All other systems reviewed and are negative.        Objective   Physical Exam  Psychiatric:         Attention and Perception: Attention normal.         Mood and Affect: Mood is depressed.         Speech: Speech normal.         Behavior: Behavior is cooperative.         Thought Content: Thought content normal.         Cognition and Memory: Cognition normal.         Judgment: Judgment normal.     Acute risk of self-harm remains low despite current stressors (acute and chronic). No reported thoughts of self-harm plan, or intent. She is future-oriented, feels  responsibility for her family, and has no hx of SA or hospitalizations.          Assessment/Plan   Lynda Porter is a 30 y/o CF who has a hx of bipolar II,OCD,and ADHD. Her mood has declined d/t experiencing two miscarriages and the anniversary of her divorce. As a result she is willing to trial  aripiprazole 30 mg to target her mood.     Continue aripiprazole 30 mg to target bipolar disorder.  Continue Concerta 54 mg to target ADHD symptoms.  Continue fluvoxamine 100 mg to target OCD symptoms.  Continue trazodone 100 mg to target insomnia.   Please seek therapy by utilizing [employer] website.  Provided with crisis/emergency resources, including the [facility] Crisis Hotline 1-980.973.3339, Crisis Text Line (text 2IDHN px 805366) and the National Suicide Prevention Lifeline hotline 1-242.231.3561. Agrees to call 911 or go to the nearest emergency department if s/he feels unsafe, or has suicidal thinking with a plan or intent.  Advised to call [phone] to report any urgent concerns and/or schedule a sooner follow-up.   Next appointment: 6 weeks

## 2025-04-16 ENCOUNTER — TELEPHONE (OUTPATIENT)
Dept: BEHAVIORAL HEALTH | Facility: CLINIC | Age: 30
End: 2025-04-16
Payer: COMMERCIAL

## 2025-04-16 DIAGNOSIS — Z86.59 HISTORY OF ADHD: ICD-10-CM

## 2025-04-16 RX ORDER — METHYLPHENIDATE HYDROCHLORIDE 54 MG/1
54 TABLET ORAL EVERY MORNING
Qty: 30 TABLET | Refills: 0 | Status: SHIPPED | OUTPATIENT
Start: 2025-04-16 | End: 2025-05-16

## 2025-04-16 RX ORDER — METHYLPHENIDATE HYDROCHLORIDE 54 MG/1
54 TABLET ORAL DAILY
Qty: 30 TABLET | Refills: 0 | Status: CANCELLED | OUTPATIENT
Start: 2025-04-17 | End: 2025-05-17

## 2025-05-19 ENCOUNTER — TELEPHONE (OUTPATIENT)
Dept: OTHER | Age: 30
End: 2025-05-19
Payer: COMMERCIAL

## 2025-05-19 DIAGNOSIS — Z86.59 HISTORY OF ADHD: ICD-10-CM

## 2025-05-19 DIAGNOSIS — F39 MOOD DISORDER: ICD-10-CM

## 2025-05-19 DIAGNOSIS — Z86.59 HISTORY OF OCD (OBSESSIVE COMPULSIVE DISORDER): ICD-10-CM

## 2025-05-19 RX ORDER — METHYLPHENIDATE HYDROCHLORIDE 54 MG/1
54 TABLET ORAL EVERY MORNING
Qty: 30 TABLET | Refills: 0 | Status: SHIPPED | OUTPATIENT
Start: 2025-05-19 | End: 2025-05-22 | Stop reason: SDUPTHER

## 2025-05-19 RX ORDER — FLUVOXAMINE MALEATE 100 MG/1
200 TABLET, COATED ORAL NIGHTLY
Qty: 180 TABLET | Refills: 1 | Status: SHIPPED | OUTPATIENT
Start: 2025-05-19

## 2025-05-19 NOTE — TELEPHONE ENCOUNTER
Caller: pt - no longer goes to [facility] pharmacy and needs all of the medications to [facility]    Medication:  Fluvoxamine 100 mgs  Methylphenidate ER 54 mgs    Pharmacy:  [facility]     Next visit: 5.22.25

## 2025-05-22 ENCOUNTER — TELEMEDICINE (OUTPATIENT)
Dept: BEHAVIORAL HEALTH | Facility: CLINIC | Age: 30
End: 2025-05-22
Payer: COMMERCIAL

## 2025-05-22 DIAGNOSIS — Z86.59 HISTORY OF OCD (OBSESSIVE COMPULSIVE DISORDER): ICD-10-CM

## 2025-05-22 DIAGNOSIS — F39 MOOD DISORDER: ICD-10-CM

## 2025-05-22 DIAGNOSIS — F31.81 BIPOLAR II DISORDER (MULTI): ICD-10-CM

## 2025-05-22 DIAGNOSIS — Z86.59 HISTORY OF ADHD: ICD-10-CM

## 2025-05-22 PROCEDURE — 99214 OFFICE O/P EST MOD 30 MIN: CPT

## 2025-05-22 RX ORDER — METHYLPHENIDATE HYDROCHLORIDE 54 MG/1
54 TABLET ORAL EVERY MORNING
Qty: 30 TABLET | Refills: 0 | Status: SHIPPED | OUTPATIENT
Start: 2025-05-22 | End: 2025-06-21

## 2025-05-22 RX ORDER — TRAZODONE HYDROCHLORIDE 100 MG/1
100 TABLET ORAL NIGHTLY
Qty: 90 TABLET | Refills: 1 | Status: SHIPPED | OUTPATIENT
Start: 2025-05-22

## 2025-05-22 NOTE — PROGRESS NOTES
Subjective   Patient ID: Lynda Porter is a 29 y.o. female who presents for ADHD, OCD, and bipolar II.     Virtual or Telephone Consent    An interactive audio and video telecommunication system which permits real time communications between the patient (at the originating site) and provider (at the distant site) was utilized to provide this telehealth service.   Verbal consent was requested and obtained from Lynda Porter on this date, 05/22/25 for a telehealth visit and the patient's location was confirmed at the time of the visit.     HPI  Currently her mood is low d/t the relationship issues with her current partner d/t him feeling that his child must always come first.  Since she had a miscarriage she lost 40 lbs d/t not being able to keep food down, she feels that her stomach is burning. She learned that she had a miscarriage d/t Matheus's chromosomes.   Denies self-harm thoughts, AVH, or SI/HI.     Review of Systems   All other systems reviewed and are negative.        Objective   Physical Exam  Psychiatric:         Attention and Perception: Attention normal.         Mood and Affect: Mood is depressed. Affect is tearful.         Speech: Speech normal.         Behavior: Behavior is cooperative.         Thought Content: Thought content normal.         Cognition and Memory: Cognition normal.         Judgment: Judgment normal.         Acute risk of self-harm remains low despite current stressors (acute and chronic). No reported thoughts of self-harm plan, or intent. She is future-oriented, feels responsibility for her family, and has no hx of SA or hospitalizations.     Assessment/Plan   Lynda Porter is a 28 y/o CF who has a hx of bipolar II,OCD,and ADHD. Her mood has declined d/t experiencing d/t the current state of her relationship.      Continue aripiprazole 30 mg to target bipolar disorder.  Continue Concerta 54 mg to target ADHD symptoms.  Continue fluvoxamine 100 mg to target OCD  symptoms.  Continue trazodone 100 mg to target insomnia.   Please seek therapy by utilizing [employer] website.  Provided with crisis/emergency resources, including the [facility] Crisis Hotline 1-805.703.9201, Crisis Text Line (text 3RRZA to 999982) and the National Suicide Prevention Lifeline hotline 1-792.360.8861. Agrees to call 911 or go to the nearest emergency department if s/he feels unsafe, or has suicidal thinking with a plan or intent.  Advised to call [phone] to report any urgent concerns and/or schedule a sooner follow-up.   Next appointment: 6 weeks

## 2025-07-10 ENCOUNTER — TELEPHONE (OUTPATIENT)
Dept: BEHAVIORAL HEALTH | Facility: CLINIC | Age: 30
End: 2025-07-10

## 2025-07-15 DIAGNOSIS — Z86.59 HISTORY OF ADHD: ICD-10-CM

## 2025-07-15 RX ORDER — METHYLPHENIDATE HYDROCHLORIDE 54 MG/1
54 TABLET ORAL EVERY MORNING
Qty: 30 TABLET | Refills: 0 | Status: SHIPPED | OUTPATIENT
Start: 2025-07-15 | End: 2025-08-14

## 2025-07-31 ENCOUNTER — APPOINTMENT (OUTPATIENT)
Dept: BEHAVIORAL HEALTH | Facility: CLINIC | Age: 30
End: 2025-07-31
Payer: COMMERCIAL

## 2025-07-31 DIAGNOSIS — Z86.59 HISTORY OF ADHD: ICD-10-CM

## 2025-07-31 DIAGNOSIS — F39 MOOD DISORDER: ICD-10-CM

## 2025-07-31 DIAGNOSIS — Z86.59 HISTORY OF OCD (OBSESSIVE COMPULSIVE DISORDER): ICD-10-CM

## 2025-07-31 PROCEDURE — 99213 OFFICE O/P EST LOW 20 MIN: CPT

## 2025-07-31 RX ORDER — ARIPIPRAZOLE 30 MG/1
30 TABLET ORAL NIGHTLY
Qty: 90 TABLET | Refills: 0 | Status: SHIPPED | OUTPATIENT
Start: 2025-07-31 | End: 2026-07-31

## 2025-07-31 RX ORDER — FLUVOXAMINE MALEATE 100 MG/1
200 TABLET, COATED ORAL NIGHTLY
Qty: 180 TABLET | Refills: 1 | Status: SHIPPED | OUTPATIENT
Start: 2025-07-31

## 2025-07-31 RX ORDER — METHYLPHENIDATE HYDROCHLORIDE 54 MG/1
54 TABLET ORAL EVERY MORNING
Qty: 30 TABLET | Refills: 0 | Status: SHIPPED | OUTPATIENT
Start: 2025-07-31 | End: 2025-08-30

## 2025-07-31 NOTE — PROGRESS NOTES
Subjective   Patient ID: Lynda Porter is a 29 y.o. female who presents for ADHD, OCD, and bipolar II.        Virtual or Telephone Consent    An interactive audio and video telecommunication system which permits real time communications between the patient (at the originating site) and provider (at the distant site) was utilized to provide this telehealth service.   Verbal consent was requested and obtained from Lynda Porter on this date, 07/31/25 for a telehealth visit and the patient's location was confirmed at the time of the visit.     HPI  Since we last met her mood has improved and she has become free of alcohol. She has been utilizing aromatherapy and journaling to help decrease stress.    She is visiting a therapist biweekly to help grieve her miscarriages and heal from her divorce.   Denies self-harm thoughts, AVH, or SI/HI.      Review of Systems   All other systems reviewed and are negative.        Objective   Physical Exam    Psychiatric:         Attention and Perception: Attention normal.         Mood and Affect: Mood normal.         Speech: Speech normal.         Behavior: Behavior is cooperative.         Thought Content: Thought content normal.         Cognition and Memory: Cognition normal.         Judgment: Judgment normal.         Lab Review:   No visits with results within 2 Month(s) from this visit.   Latest known visit with results is:   Orders Only on 06/29/2023   Component Date Value    DRUG SCREEN COMMENT URINE 06/29/2023 SEE BELOW     Amphetamine Screen, Urine 06/29/2023 PRESUMPTIVE POSITIVE (A)     Barbiturate Screen, Urine 06/29/2023 PRESUMPTIVE NEGATIVE     BENZODIAZEPINE (PRESENCE* 06/29/2023 PRESUMPTIVE NEGATIVE     Cannabinoid Screen, Urine 06/29/2023 PRESUMPTIVE NEGATIVE     Cocaine Screen, Urine 06/29/2023 PRESUMPTIVE NEGATIVE     Fentanyl, Ur 06/29/2023 PRESUMPTIVE NEGATIVE     Methadone Screen, Urine 06/29/2023 PRESUMPTIVE NEGATIVE     Opiate Screen, Urine  06/29/2023 PRESUMPTIVE NEGATIVE     Oxycodone Screen, Ur 06/29/2023 PRESUMPTIVE NEGATIVE     PCP Screen, Urine 06/29/2023 PRESUMPTIVE NEGATIVE     Amphetamines,Urine 06/29/2023 1,285     MDA, Urine 06/29/2023 <200     MDEA, Urine 06/29/2023 <200     MDMA, Urine 06/29/2023 <200     Methamphetamine Quant, Ur 06/29/2023 <200     Phentermine,Urine 06/29/2023 <200    Acute risk of self-harm remains low despite current stressors (acute and chronic). No reported thoughts of self-harm plan, or intent. She is future-oriented, feels responsibility for her family, and has no hx of SA or hospitalizations.     Assessment/Plan   Lynda Porter is a 28 y/o CF who has a hx of bipolar II,OCD,and ADHD. Her mood has improved d/t therapy and consistency of her medication regiment.      Continue aripiprazole 30 mg to target bipolar disorder.  Continue Concerta 54 mg to target ADHD symptoms.  Continue fluvoxamine 100 mg to target OCD symptoms.  Continue trazodone 100 mg to target insomnia.   Please seek therapy by utilizing [employer] website.  Provided with crisis/emergency resources, including the [facility] Crisis Hotline 1-379.437.1780, Crisis Text Line (text 8GZRJ dg 134894) and the National Suicide Prevention Lifeline hotline 1-208.216.9398. Agrees to call 911 or go to the nearest emergency department if s/he feels unsafe, or has suicidal thinking with a plan or intent.  Advised to call [phone] to report any urgent concerns and/or schedule a sooner follow-up.   Next appointment: 6 weeks

## 2025-08-21 ENCOUNTER — APPOINTMENT (OUTPATIENT)
Dept: BEHAVIORAL HEALTH | Facility: CLINIC | Age: 30
End: 2025-08-21
Payer: COMMERCIAL

## 2025-08-21 DIAGNOSIS — F39 MOOD DISORDER: ICD-10-CM

## 2025-08-21 DIAGNOSIS — Z86.59 HISTORY OF OCD (OBSESSIVE COMPULSIVE DISORDER): ICD-10-CM

## 2025-08-21 DIAGNOSIS — Z86.59 HISTORY OF ADHD: ICD-10-CM

## 2025-08-21 PROCEDURE — 99214 OFFICE O/P EST MOD 30 MIN: CPT

## 2025-08-21 RX ORDER — METHYLPHENIDATE HYDROCHLORIDE 54 MG/1
54 TABLET ORAL EVERY MORNING
Qty: 30 TABLET | Refills: 0 | Status: SHIPPED | OUTPATIENT
Start: 2025-08-21 | End: 2025-09-20

## 2025-08-21 RX ORDER — TRAZODONE HYDROCHLORIDE 100 MG/1
100 TABLET ORAL NIGHTLY
Qty: 90 TABLET | Refills: 1 | Status: SHIPPED | OUTPATIENT
Start: 2025-08-21

## 2025-10-23 ENCOUNTER — APPOINTMENT (OUTPATIENT)
Dept: BEHAVIORAL HEALTH | Facility: CLINIC | Age: 30
End: 2025-10-23
Payer: COMMERCIAL